# Patient Record
Sex: MALE | Race: WHITE
[De-identification: names, ages, dates, MRNs, and addresses within clinical notes are randomized per-mention and may not be internally consistent; named-entity substitution may affect disease eponyms.]

---

## 2018-02-23 ENCOUNTER — HOSPITAL ENCOUNTER (EMERGENCY)
Dept: HOSPITAL 45 - C.EDB | Age: 23
Discharge: HOME | End: 2018-02-23
Payer: COMMERCIAL

## 2018-02-23 VITALS — SYSTOLIC BLOOD PRESSURE: 140 MMHG | OXYGEN SATURATION: 99 % | DIASTOLIC BLOOD PRESSURE: 62 MMHG | HEART RATE: 98 BPM

## 2018-02-23 VITALS
BODY MASS INDEX: 25.26 KG/M2 | BODY MASS INDEX: 25.26 KG/M2 | HEIGHT: 72.01 IN | WEIGHT: 186.51 LBS | WEIGHT: 186.51 LBS | HEIGHT: 72.01 IN

## 2018-02-23 DIAGNOSIS — S09.90XA: Primary | ICD-10-CM

## 2018-02-23 DIAGNOSIS — S00.01XA: ICD-10-CM

## 2018-02-23 DIAGNOSIS — S60.221A: ICD-10-CM
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
## 2018-02-23 NOTE — DIAGNOSTIC IMAGING REPORT
R HAND MIN 3 VIEWS ROUTINE



CLINICAL HISTORY: punched wall trauma



COMPARISON: None.



DISCUSSION: The bones and joint spaces appear intact. There is no evidence of

fracture, dislocation or bony disease. There is no evidence for soft tissue

swelling.



IMPRESSION: Negative study.











The above report was generated using voice recognition software.  It may contain

grammatical, syntax or spelling errors.







Electronically signed by:  Macario Chapman M.D.

2/23/2018 6:44 AM



Dictated Date/Time:  2/23/2018 6:43 AM

## 2018-02-23 NOTE — EMERGENCY ROOM VISIT NOTE
History


First contact with patient:  03:47


Chief Complaint:  HEAD INJURY (MINOR)


Stated Complaint:  HEAD AND HAND INJURY/ETOH





History of Present Illness


The patient is a 22 year old male who presents to the Emergency Room with 

complaints of right hand pain and forehead pain.  Patient states he was upset 

with his girlfriend and punched the wall and hit his head on the wall.  Patient 

states he was upset and that is why he did this.  Patient denies loss of 

conscious, facial pain, dental pain, vision problems, neck pain, back pain, 

numbness, tingling or any other medical complaints.  No prior injury to this 

hand.  Patient denies suicidal or homicidal ideations.  No drugs.  Patient 

states he had a few beers a few hours ago but nothing recent.





Review of Systems


An 10 system review of systems was completed with positives and pertinent 

negatives listed in the HPI.





Past Medical/Surgical History


none





Social History


Smoking Status:  Never Smoker


Smokeless Tobacco Use:  No


Alcohol Use:  occasionally


Drug Use:  none


Marital Status:  in relationship





Physical Exam


Vital Signs











  Date Time  Temp Pulse Resp B/P (MAP) Pulse Ox O2 Delivery O2 Flow Rate FiO2


 


2/23/18 05:08  98 20 140/62 99 Room Air  


 


2/23/18 04:45  92 20 131/74 98 Room Air  


 


2/23/18 04:45   20  98   











Physical Exam


VITALS: Vitals are noted on the nurse's note and reviewed by myself.  Vital 

signs stable.


GENERAL: White male pacing the room, in no acute distress, nondiaphoretic, well-

developed well-nourished.


SKIN: Contusion to right hand, the rest of the skin was without rashes, erythema

, edema, or bruising.  There is no tenting of the skin.  Capillary reflex less 

than 2 seconds.


HEAD: Normocephalic atraumatic.  Frontal scalp abrasion without signs of 

infection


EARS: External auditory canals clear, tympanic membranes pearly gray without 

erythema or effusion bilaterally.


EYES: Pupils equal round and reactive to light and accommodation.  Conjunctivae 

without injection, sclerae without icterus.  Extraocular movements intact.  


NOSE: Patent, turbinates without inflammation or discharge.  No sinus 

tenderness.


MOUTH: Mucous membranes moist.     Pharynx without erythema or exudate.  Uvula 

midline.  Airway patent.  Tongue does not deviate.  


NECK: Supple without nuchal rigidity.  No lymphadenopathy.  No thyromegaly.  

Cervical spine is nontender.  No JVD.


HEART: Regular rate and rhythm without murmurs gallops or rubs.


LUNGS: Clear to auscultation bilaterally without wheezes, rales or rhonchi.  No 

dullness to percussion.  No retractions or accessory muscle use.


ABDOMEN: Positive bowel sounds x 4.  Normal tympanic percussion.  Soft, 

nontender, without masses or organomegaly.  Hamilton sign negative.  No guarding 

or rebound tenderness.


MUSCULOSKELETAL: No muscle atrophy, erythema, or edema noted.  Right hand third 

metacarpal minimally tender to palpation with contusion present, full range of 

motion of the hand without pain, full range of motion without joint tenderness 

in all other extremities.  No tenderness to palpation.  Normal gait.  Strength 5

/5 throughout.


NEURO: Patient was alert and oriented to person place and time.  Normal 

sensation to light and sharp touch.   No focal neurological deficits.





Medical Decision & Procedures


ED Course


Prior records/ancillary studies reviewed.


Triage Nursing notes reviewed.


 


The patient's history was concerning for traumatic head injury and hand injury





Differential diagnosis:





Etiologies such as concussion, contusion, fracture, subdural hematoma, epidural 

hematoma, intraparenchymal hemorrhage, as well as other traumatic pathologies 

were entertained.





Physical examination findings:


As above.





ER treatment provided:


Ice pack was applied


On reassessment the patient felt better.





Diagnostics interpreted by me:








Imaging studies:


Hand x-ray negative for fracture per my interpretation of my attending


Head CT was reviewed with no intracranial bleed, arachnoid cyst present.  Read 

by radiology.








It appears the patient has a mild head injury and hand injury.  Patient was 

neurovascularly and neurologically intact.  He is well appearing.  No other 

injuries are noted.  He was ambulating without difficulties.  He was actually 

doing push-ups in the room and training for his [employer].. Patient was counseled on 

head injury signs and symptoms of verbalized understanding of this.  He is 

advised to follow-up family care in a few days here in the ER sooner for 

headache, fevers, pain, worsening signs or symptoms or as needed.  He ambulated 

out of the room without difficulties.





By the evaluation outlined above emergent etiologies such as fracture, subdural 

hematoma, epidural hematoma, intraparenchymal hemorrhage, as well as others 

were deemed relatively unlikely.





The pt informed about the findings as listed above. All questions were answered 

and  pleased with the treatment. Return instructions were outlined and the 

patient was discharged in stable condition.





Case reviewed with my attending





Referral:


The patient was referred back to their primary care physician for follow-up in 

2 to 3 days for a recheck of the current condition.





Medical Decision


as above





Medication Reconcilliation


Current Medication List:  was personally reviewed by me





Blood Pressure Screening


Patient's blood pressure:  Normal blood pressure





Impression





 Primary Impression:  


 Closed head injury


 Additional Impressions:  


 Injury of right hand


 Scalp abrasion





Departure Information


Dispostion


Home / Self-Care





Condition


GOOD





Referrals


No Doctor, Assigned (PCP)





Patient Instructions


[facility]





Additional Instructions





Head injury:





Read head injury handout and return for any symptoms.  Tylenol 1000 mg as 

needed for pain (Maximum 3000 mg Tylenol in 24 hr period). Avoid alcohol and 

contact sports/activities for one week and follow up with family doctor prior 

to returning to these activities if still symptomatic. Ice and elevate head.





If your symptoms persist more than a week then follow up with the concussion 

clinic. Call [phone].





Return to ER sooner for headache, fevers, confusion, worsening signs or 

symptoms or as needed.





Hand injury:





Ice compresses for 20 minutes at a time four times daily for 2-3 days.





Rest and elevate your injury.





Return to the ER immediately for any numbness, tingling, severe pain, extreme 

swelling in the extremity or as needed.





Call Orthopedics in 3-5 days if symptoms persist to arrange follow up for your 

injury.





Problem Qualifiers








 Primary Impression:  


 Closed head injury


 Encounter type:  initial encounter  Qualified Codes:  S09.90XA - Unspecified 

injury of head, initial encounter

## 2018-02-23 NOTE — DIAGNOSTIC IMAGING REPORT
CT SCAN OF THE BRAIN WITHOUT IV CONTRAST



CLINICAL HISTORY: Head injury.



COMPARISON STUDY:  No priors.



TECHNIQUE: Unenhanced axial CT scan of the brain is performed from the vertex to

the skull base.  A dose lowering technique was utilized adhering to the

principles of ALARA.



CT DOSE: 614.27 mGy.cm



FINDINGS:



Brain parenchyma: There is an approximately 7 x 3.5 cm CSF attenuation lesion in

the anterior left temporal fossa. This causes mild mass effect on the left

temporal lobe and is typical in appearance for an arachnoid cyst. There is no

hemorrhage, midline shift, or evidence of acute territorial ischemia by CT

criteria. Gray-white matter is preserved. No extra-axial fluid collection is

seen.



Ventricles, sulci, cisterns: Normal in configuration.



Intracranial vasculature: The visualized intracranial vasculature at the skull

base is normal in appearance.



Calvarium: There is no depressed calvarial fracture.



Sinuses and mastoids: There is mild mucosal thickening within the left

maxillary, ethmoid, in the sphenoid sinuses. The mastoid air cells are well

pneumatized.



Orbits: The bony orbits are grossly intact.





IMPRESSION: 



1. No acute intracranial abnormality.



2. A large arachnoid cyst is identified in the left anterior temporal fossa.

This causes mild localized mass effect.







Electronically signed by:  Bennie Morrow M.D.

2/23/2018 7:21 AM



Dictated Date/Time:  2/23/2018 7:18 AM